# Patient Record
Sex: FEMALE | Race: BLACK OR AFRICAN AMERICAN
[De-identification: names, ages, dates, MRNs, and addresses within clinical notes are randomized per-mention and may not be internally consistent; named-entity substitution may affect disease eponyms.]

---

## 2017-06-10 ENCOUNTER — HOSPITAL ENCOUNTER (EMERGENCY)
Dept: HOSPITAL 15 - ER | Age: 22
Discharge: HOME | End: 2017-06-10
Payer: MEDICAID

## 2017-06-10 VITALS — HEIGHT: 70 IN | WEIGHT: 120 LBS | BODY MASS INDEX: 17.18 KG/M2

## 2017-06-10 VITALS — SYSTOLIC BLOOD PRESSURE: 103 MMHG | DIASTOLIC BLOOD PRESSURE: 54 MMHG

## 2017-06-10 DIAGNOSIS — O99.331: ICD-10-CM

## 2017-06-10 DIAGNOSIS — F17.210: ICD-10-CM

## 2017-06-10 DIAGNOSIS — O34.81: Primary | ICD-10-CM

## 2017-06-10 DIAGNOSIS — F12.10: ICD-10-CM

## 2017-06-10 DIAGNOSIS — N83.202: ICD-10-CM

## 2017-06-10 DIAGNOSIS — Z91.010: ICD-10-CM

## 2017-06-10 DIAGNOSIS — Z3A.01: ICD-10-CM

## 2017-06-10 LAB
ALBUMIN SERPL-MCNC: 3.4 G/DL (ref 3.4–5)
ALP SERPL-CCNC: 66 U/L (ref 45–117)
ANION GAP SERPL CALCULATED.3IONS-SCNC: 9 MMOL/L (ref 5–15)
BILIRUB SERPL-MCNC: 0.4 MG/DL (ref 0.2–1)
BUN SERPL-MCNC: 10 MG/DL (ref 7–18)
BUN/CREAT SERPL: 15.4
CALCIUM SERPL-MCNC: 8.4 MG/DL (ref 8.5–10.1)
CHLORIDE SERPL-SCNC: 109 MMOL/L (ref 98–107)
CO2 SERPL-SCNC: 23 MMOL/L (ref 21–32)
GLUCOSE SERPL-MCNC: 84 MG/DL (ref 74–106)
HCT VFR BLD AUTO: 36.9 % (ref 36–46)
HGB BLD-MCNC: 12.6 G/DL (ref 12.2–16.2)
MCH RBC QN AUTO: 29.9 PG (ref 28–32)
MCV RBC AUTO: 87.6 FL (ref 80–100)
NRBC BLD QL AUTO: 0 %
POTASSIUM SERPL-SCNC: 4.1 MMOL/L (ref 3.5–5.1)
PROT SERPL-MCNC: 7.3 G/DL (ref 6.4–8.2)
SODIUM SERPL-SCNC: 141 MMOL/L (ref 136–145)

## 2017-06-10 PROCEDURE — 85025 COMPLETE CBC W/AUTO DIFF WBC: CPT

## 2017-06-10 PROCEDURE — 84702 CHORIONIC GONADOTROPIN TEST: CPT

## 2017-06-10 PROCEDURE — 76801 OB US < 14 WKS SINGLE FETUS: CPT

## 2017-06-10 PROCEDURE — 80053 COMPREHEN METABOLIC PANEL: CPT

## 2017-06-10 PROCEDURE — 36415 COLL VENOUS BLD VENIPUNCTURE: CPT

## 2017-06-10 PROCEDURE — 76817 TRANSVAGINAL US OBSTETRIC: CPT

## 2017-06-10 PROCEDURE — 81001 URINALYSIS AUTO W/SCOPE: CPT

## 2020-09-13 ENCOUNTER — HOSPITAL ENCOUNTER (OUTPATIENT)
Dept: HOSPITAL 15 - LDRP | Age: 25
Setting detail: OBSERVATION
Discharge: HOME | End: 2020-09-13
Attending: OBSTETRICS & GYNECOLOGY | Admitting: OBSTETRICS & GYNECOLOGY
Payer: MEDICAID

## 2020-09-13 VITALS — WEIGHT: 140 LBS | BODY MASS INDEX: 20.73 KG/M2 | HEIGHT: 69 IN

## 2020-09-13 DIAGNOSIS — Z87.51: ICD-10-CM

## 2020-09-13 DIAGNOSIS — O60.03: Primary | ICD-10-CM

## 2020-09-13 DIAGNOSIS — Z3A.32: ICD-10-CM

## 2020-09-13 PROCEDURE — 81002 URINALYSIS NONAUTO W/O SCOPE: CPT

## 2020-09-13 PROCEDURE — 76815 OB US LIMITED FETUS(S): CPT

## 2020-09-13 PROCEDURE — 59025 FETAL NON-STRESS TEST: CPT

## 2020-09-21 ENCOUNTER — HOSPITAL ENCOUNTER (OUTPATIENT)
Dept: HOSPITAL 15 - LDRP | Age: 25
Setting detail: OBSERVATION
Discharge: HOME | End: 2020-09-21
Attending: OBSTETRICS & GYNECOLOGY | Admitting: OBSTETRICS & GYNECOLOGY
Payer: MEDICAID

## 2020-09-21 DIAGNOSIS — Z3A.33: ICD-10-CM

## 2020-09-21 DIAGNOSIS — O13.3: Primary | ICD-10-CM

## 2020-09-21 DIAGNOSIS — Z79.899: ICD-10-CM

## 2020-09-21 LAB
ALCOHOL, URINE: 10 MG/DL (ref 0–10)
AMPHETAMINES UR QL SCN: NEGATIVE
BARBITURATES UR QL SCN: NEGATIVE
BENZODIAZ UR QL SCN: NEGATIVE
BZE UR QL SCN: NEGATIVE
CANNABINOIDS UR QL SCN: POSITIVE
OPIATES UR QL SCN: NEGATIVE
PCP UR QL SCN: NEGATIVE

## 2020-09-21 PROCEDURE — 59025 FETAL NON-STRESS TEST: CPT

## 2020-09-21 PROCEDURE — 76815 OB US LIMITED FETUS(S): CPT

## 2020-09-21 PROCEDURE — 80307 DRUG TEST PRSMV CHEM ANLYZR: CPT

## 2020-09-21 PROCEDURE — 81001 URINALYSIS AUTO W/SCOPE: CPT

## 2020-09-21 PROCEDURE — 81002 URINALYSIS NONAUTO W/O SCOPE: CPT

## 2020-09-26 ENCOUNTER — HOSPITAL ENCOUNTER (OUTPATIENT)
Dept: HOSPITAL 15 - LDRP | Age: 25
Setting detail: OBSERVATION
Discharge: HOME | End: 2020-09-26
Attending: SPECIALIST | Admitting: SPECIALIST
Payer: MEDICAID

## 2020-09-26 DIAGNOSIS — O60.03: Primary | ICD-10-CM

## 2020-09-26 DIAGNOSIS — Z3A.34: ICD-10-CM

## 2020-09-26 PROCEDURE — 76815 OB US LIMITED FETUS(S): CPT

## 2020-09-26 PROCEDURE — 81002 URINALYSIS NONAUTO W/O SCOPE: CPT

## 2020-09-26 PROCEDURE — 59025 FETAL NON-STRESS TEST: CPT

## 2020-10-07 ENCOUNTER — HOSPITAL ENCOUNTER (OUTPATIENT)
Dept: HOSPITAL 15 - LDRP | Age: 25
Setting detail: OBSERVATION
Discharge: HOME | End: 2020-10-07
Attending: SPECIALIST | Admitting: SPECIALIST
Payer: MEDICAID

## 2020-10-07 DIAGNOSIS — O36.5930: Primary | ICD-10-CM

## 2020-10-07 DIAGNOSIS — Z3A.36: ICD-10-CM

## 2020-10-07 PROCEDURE — 76818 FETAL BIOPHYS PROFILE W/NST: CPT

## 2020-10-07 PROCEDURE — 81002 URINALYSIS NONAUTO W/O SCOPE: CPT

## 2020-10-07 PROCEDURE — 59025 FETAL NON-STRESS TEST: CPT

## 2020-10-08 ENCOUNTER — HOSPITAL ENCOUNTER (INPATIENT)
Dept: HOSPITAL 15 - LDRP | Age: 25
LOS: 2 days | Discharge: HOME | DRG: 560 | End: 2020-10-10
Attending: SPECIALIST | Admitting: SPECIALIST
Payer: MEDICAID

## 2020-10-08 VITALS — BODY MASS INDEX: 24.59 KG/M2 | WEIGHT: 144 LBS | HEIGHT: 64 IN

## 2020-10-08 DIAGNOSIS — O36.5930: Primary | ICD-10-CM

## 2020-10-08 DIAGNOSIS — Z20.828: ICD-10-CM

## 2020-10-08 DIAGNOSIS — Z91.010: ICD-10-CM

## 2020-10-08 DIAGNOSIS — Z91.19: ICD-10-CM

## 2020-10-08 DIAGNOSIS — F12.90: ICD-10-CM

## 2020-10-08 DIAGNOSIS — Z3A.36: ICD-10-CM

## 2020-10-08 LAB
ALBUMIN SERPL-MCNC: 2.8 G/DL (ref 3.4–5)
ALCOHOL, URINE: 9 MG/DL (ref 0–10)
ALP SERPL-CCNC: 122 U/L (ref 45–117)
ALT SERPL-CCNC: 13 U/L (ref 13–56)
AMPHETAMINES UR QL SCN: NEGATIVE
ANION GAP SERPL CALCULATED.3IONS-SCNC: 9 MMOL/L (ref 5–15)
APTT PPP: 28.7 SEC (ref 23–31.2)
BARBITURATES UR QL SCN: NEGATIVE
BENZODIAZ UR QL SCN: NEGATIVE
BILIRUB SERPL-MCNC: 0.3 MG/DL (ref 0.2–1)
BUN SERPL-MCNC: 9 MG/DL (ref 7–18)
BUN/CREAT SERPL: 18.4
BZE UR QL SCN: NEGATIVE
CALCIUM SERPL-MCNC: 8.9 MG/DL (ref 8.5–10.1)
CANNABINOIDS UR QL SCN: POSITIVE
CHLORIDE SERPL-SCNC: 107 MMOL/L (ref 98–107)
CO2 SERPL-SCNC: 20 MMOL/L (ref 21–32)
GLUCOSE SERPL-MCNC: 72 MG/DL (ref 74–106)
HCT VFR BLD AUTO: 34.1 % (ref 36–46)
HGB BLD-MCNC: 11.4 G/DL (ref 12.2–16.2)
INR PPP: 0.94 (ref 0.9–1.15)
MCH RBC QN AUTO: 30.5 PG (ref 28–32)
MCV RBC AUTO: 91 FL (ref 80–100)
NRBC BLD QL AUTO: 0 %
OPIATES UR QL SCN: NEGATIVE
PCP UR QL SCN: NEGATIVE
POTASSIUM SERPL-SCNC: 3.9 MMOL/L (ref 3.5–5.1)
PROT SERPL-MCNC: 7 G/DL (ref 6.4–8.2)
SODIUM SERPL-SCNC: 136 MMOL/L (ref 136–145)

## 2020-10-08 PROCEDURE — 59025 FETAL NON-STRESS TEST: CPT

## 2020-10-08 PROCEDURE — 84112 EVAL AMNIOTIC FLUID PROTEIN: CPT

## 2020-10-08 PROCEDURE — 96361 HYDRATE IV INFUSION ADD-ON: CPT

## 2020-10-08 PROCEDURE — 94762 N-INVAS EAR/PLS OXIMTRY CONT: CPT

## 2020-10-08 PROCEDURE — 81001 URINALYSIS AUTO W/SCOPE: CPT

## 2020-10-08 PROCEDURE — 96365 THER/PROPH/DIAG IV INF INIT: CPT

## 2020-10-08 PROCEDURE — 85025 COMPLETE CBC W/AUTO DIFF WBC: CPT

## 2020-10-08 PROCEDURE — 86901 BLOOD TYPING SEROLOGIC RH(D): CPT

## 2020-10-08 PROCEDURE — 80307 DRUG TEST PRSMV CHEM ANLYZR: CPT

## 2020-10-08 PROCEDURE — 62282 TREAT SPINAL CANAL LESION: CPT

## 2020-10-08 PROCEDURE — 96360 HYDRATION IV INFUSION INIT: CPT

## 2020-10-08 PROCEDURE — 81002 URINALYSIS NONAUTO W/O SCOPE: CPT

## 2020-10-08 PROCEDURE — 80053 COMPREHEN METABOLIC PANEL: CPT

## 2020-10-08 PROCEDURE — 85730 THROMBOPLASTIN TIME PARTIAL: CPT

## 2020-10-08 PROCEDURE — 36415 COLL VENOUS BLD VENIPUNCTURE: CPT

## 2020-10-08 PROCEDURE — 86900 BLOOD TYPING SEROLOGIC ABO: CPT

## 2020-10-08 PROCEDURE — 59409 OBSTETRICAL CARE: CPT

## 2020-10-08 PROCEDURE — 87426 SARSCOV CORONAVIRUS AG IA: CPT

## 2020-10-08 PROCEDURE — 00HU33Z INSERTION OF INFUSION DEVICE INTO SPINAL CANAL, PERCUTANEOUS APPROACH: ICD-10-PCS | Performed by: ANESTHESIOLOGY

## 2020-10-08 PROCEDURE — 85610 PROTHROMBIN TIME: CPT

## 2020-10-08 PROCEDURE — 3E0R3BZ INTRODUCTION OF ANESTHETIC AGENT INTO SPINAL CANAL, PERCUTANEOUS APPROACH: ICD-10-PCS | Performed by: ANESTHESIOLOGY

## 2020-10-08 PROCEDURE — 96366 THER/PROPH/DIAG IV INF ADDON: CPT

## 2020-10-08 PROCEDURE — 86850 RBC ANTIBODY SCREEN: CPT

## 2020-10-09 VITALS — SYSTOLIC BLOOD PRESSURE: 103 MMHG | DIASTOLIC BLOOD PRESSURE: 55 MMHG

## 2020-10-09 VITALS — SYSTOLIC BLOOD PRESSURE: 110 MMHG | DIASTOLIC BLOOD PRESSURE: 76 MMHG

## 2020-10-09 VITALS — DIASTOLIC BLOOD PRESSURE: 58 MMHG | SYSTOLIC BLOOD PRESSURE: 105 MMHG

## 2020-10-09 VITALS — SYSTOLIC BLOOD PRESSURE: 118 MMHG | DIASTOLIC BLOOD PRESSURE: 59 MMHG

## 2020-10-09 RX ADMIN — IBUPROFEN PRN MG: 600 TABLET, FILM COATED ORAL at 06:48

## 2020-10-09 RX ADMIN — IBUPROFEN PRN MG: 600 TABLET, FILM COATED ORAL at 11:08

## 2020-10-09 RX ADMIN — IBUPROFEN PRN MG: 600 TABLET, FILM COATED ORAL at 23:07

## 2020-10-09 NOTE — NUR
Assessment 



SS consult regarding positive drug screen for THC.  Baby drug screen was pending per bedside 
nurse.  Discussed with pt UDS and source of positive results.  Per patient she used CBD Oil 
and CBD Tea and stop when she found out she was pregnant. Patient denies smoking THC. Pt 
resides with [facility] and will have assistance with baby  upon discharge.  Pt has all supplies 
as well as car seat for baby and will be bottle/breastfeeding (select one). Pt has medical 
insurance for herself/baby and will be following up with provider appt post discharge. Pt 
has transportation home upon discharge.  Due to positive drug screen CPS contacted and 
report made to [facility] number: [phone].

## 2020-10-09 NOTE — NUR
Patient requests to go outside and smoke. Patient educated on risks of leaving unit without 
staff assistance. Patient verbalized understanding. Informed consent for smoking signed and 
IV discontinued. IV catheter intact, no signs or symptoms of infiltration noted upon 
removal.

## 2020-10-09 NOTE — NUR
Ambulation:

Patient OOB with standby assistance by RN.  Patient ambulated to bathroom with steady gait.  
Patient able to void 400 ml without difficulty.  Pericare teaching provided with returned 
demonstration by patient.  Clean gown provided and bed linen changed.  Patient ambulated 
back to bed with steady gait and no distress noted.

## 2020-10-09 NOTE — NUR
RN walked into room and infant was lying on head of bed with pillows positioned all around 
her with head of bed at an incline. Reinforced infant safety measures and informed that baby 
must remain in the open crib if not being held while in the hospital.  Patient and father of 
baby verbalized understanding and agree to comply.

## 2020-10-10 VITALS — SYSTOLIC BLOOD PRESSURE: 123 MMHG | DIASTOLIC BLOOD PRESSURE: 73 MMHG

## 2020-10-10 VITALS — DIASTOLIC BLOOD PRESSURE: 69 MMHG | SYSTOLIC BLOOD PRESSURE: 114 MMHG

## 2020-10-10 VITALS — DIASTOLIC BLOOD PRESSURE: 53 MMHG | SYSTOLIC BLOOD PRESSURE: 94 MMHG

## 2020-10-10 RX ADMIN — IBUPROFEN PRN MG: 600 TABLET, FILM COATED ORAL at 05:09

## 2020-10-10 NOTE — NUR
Discharge:

Discharge instructions given as ordered. Pt encouraged to follow up with OB/GYN as 
instructed.  All questions and concerns addressed.  Patient verbalized understanding.  
Medication reconciliation completed and copy given to patient.  .  Patient encouraged to 
prepare to depart unit.

## 2020-10-10 NOTE — NUR
PT STATES THAT SHE IS IN PAIN AT EPIDURAL SITE. MOTRIN GIVEN. BABY TAKEN TO NURSERY FOR 24 
HOUR CARE. DAD COMES OUT SLAMMING INTO DOORS STATING THAT PT NEEDS HELP. RN X2 AT BEDSIDE. 
VITALS TAKEN BLOOD PRESSURE 117/59 PULSE 71 RESP 16. PT IS ASKING FOR STRONGER MEDICATION. 
PT HAS LEFT UNIT TWICE ON MY SHIFT FOR A SMOKE BREAK. NURSE STATED TO PATIENT THAT SHE WILL 
NOT BE ABLE TO GO HOME IF PATIENT IS IN PAIN LIKE SHE IS. PT CALMED DOWN IMMEDIATELY. 

[facility] DEPART[facility] CALLED STATING THAT DAD CALLED THE POLICE DEPARTMENT STATING THE PATIENT 
IS IN PAIN. 

RN ASKED DAD IF HE CALLED THE POLICE STATION, HE STATED THAT " YES, BECAUSE I DIDN'T KNOW 
WHAT TO DO". DAD IS ON THE PHONE STATING THAT HE IS TRYING TO CALM DOWN BEFORE HE DOES 
SOMETHING. 

PT IN LAYING IN BED CALM. TYLENOL GIVEN.